# Patient Record
Sex: FEMALE | Race: WHITE | NOT HISPANIC OR LATINO | Employment: UNEMPLOYED | URBAN - NONMETROPOLITAN AREA
[De-identification: names, ages, dates, MRNs, and addresses within clinical notes are randomized per-mention and may not be internally consistent; named-entity substitution may affect disease eponyms.]

---

## 2020-01-16 ENCOUNTER — OFFICE VISIT (OUTPATIENT)
Dept: CARDIOLOGY | Facility: CLINIC | Age: 70
End: 2020-01-16

## 2020-01-16 VITALS
OXYGEN SATURATION: 91 % | DIASTOLIC BLOOD PRESSURE: 78 MMHG | SYSTOLIC BLOOD PRESSURE: 151 MMHG | BODY MASS INDEX: 34.39 KG/M2 | HEIGHT: 66 IN | HEART RATE: 89 BPM | WEIGHT: 214 LBS

## 2020-01-16 DIAGNOSIS — I10 ESSENTIAL HYPERTENSION: ICD-10-CM

## 2020-01-16 DIAGNOSIS — I20.8 ANGINAL EQUIVALENT (HCC): ICD-10-CM

## 2020-01-16 DIAGNOSIS — R06.09 DYSPNEA ON EXERTION: Primary | ICD-10-CM

## 2020-01-16 PROCEDURE — 99204 OFFICE O/P NEW MOD 45 MIN: CPT | Performed by: INTERNAL MEDICINE

## 2020-01-16 PROCEDURE — 93000 ELECTROCARDIOGRAM COMPLETE: CPT | Performed by: INTERNAL MEDICINE

## 2020-01-16 RX ORDER — LEVOTHYROXINE SODIUM 0.07 MG/1
TABLET ORAL
COMMUNITY
Start: 2020-01-14

## 2020-01-16 RX ORDER — ERGOCALCIFEROL 1.25 MG/1
50000 CAPSULE ORAL WEEKLY
COMMUNITY

## 2020-01-16 RX ORDER — ROSUVASTATIN CALCIUM 20 MG/1
TABLET, COATED ORAL
COMMUNITY
Start: 2020-01-06

## 2020-01-16 RX ORDER — MECLIZINE HYDROCHLORIDE 25 MG/1
TABLET ORAL
COMMUNITY
Start: 2020-01-06

## 2020-01-16 RX ORDER — AMLODIPINE BESYLATE 10 MG/1
TABLET ORAL
COMMUNITY
Start: 2019-12-30

## 2020-01-16 RX ORDER — METOPROLOL SUCCINATE 50 MG/1
TABLET, EXTENDED RELEASE ORAL
COMMUNITY
Start: 2019-12-30

## 2020-01-16 RX ORDER — ISOSORBIDE DINITRATE 10 MG/1
TABLET ORAL
COMMUNITY
Start: 2019-12-30

## 2020-01-16 RX ORDER — MELOXICAM 15 MG/1
TABLET ORAL
COMMUNITY
Start: 2019-12-27

## 2020-01-16 RX ORDER — GABAPENTIN 600 MG/1
TABLET ORAL
COMMUNITY
Start: 2020-01-03

## 2020-01-16 RX ORDER — HYDROCODONE BITARTRATE AND ACETAMINOPHEN 10; 325 MG/1; MG/1
TABLET ORAL
COMMUNITY
Start: 2020-01-03

## 2020-01-16 RX ORDER — HYDROXYCHLOROQUINE SULFATE 200 MG/1
TABLET, FILM COATED ORAL
COMMUNITY
Start: 2019-12-19

## 2020-01-16 RX ORDER — METOCLOPRAMIDE 10 MG/1
10 TABLET ORAL
COMMUNITY

## 2020-01-16 RX ORDER — NORTRIPTYLINE HYDROCHLORIDE 50 MG/1
CAPSULE ORAL
COMMUNITY
Start: 2020-01-14

## 2020-01-16 RX ORDER — ASPIRIN 325 MG
325 TABLET ORAL DAILY
COMMUNITY

## 2020-01-16 RX ORDER — VILAZODONE HYDROCHLORIDE 40 MG/1
TABLET ORAL
COMMUNITY
Start: 2020-01-06

## 2020-01-16 NOTE — H&P (VIEW-ONLY)
[facility] CARDIOLOGY  [address] [facility] [address]  [place] [address]  Phone: [phone]  Fax: [phone]    01/16/2020    Chief Complaint   Patient presents with   • Shortness of Breath   • Chest Pain        History:   Nathaly Jeffers is a 69 y.o. female seen in consultation, referred by Tonie Mccray MD for abnormal LHC.  She had a LHC in 2016 that showed she needed intervention. The facility in [place] was unable to do any intervention.  She states she has been trying to get transportation for 2 years to follow up for intervention here.She continues to have dyspnea upon exertion.Can be non-exertional.  She has occasional episodes of chest pain described as sharp.  Only last a second then resolved.    The chart and medications were reviewed today. Problems above addressed this visit.      Past Medical History:   Diagnosis Date   • Arthritis    • Fibromyalgia    • Hypertension    • Neuropathy    • Vertigo        Past Surgical History:   Procedure Laterality Date   • CARDIAC CATHETERIZATION     • CARPAL TUNNEL RELEASE Bilateral    • COLONOSCOPY     • FOOT SURGERY Right    • GALLBLADDER SURGERY     • GASTROSCOPY     • HERNIA REPAIR     • HYSTERECTOMY     • TUBAL ABDOMINAL LIGATION          Review of Systems:  Please see HPI  Constitution: No chills, no rigors, no unexplained weight loss or weight gain  Eyes:  No diplopia, no blurred vision, no loss of vision, conjunctiva is pink and sclera is anicteric  ENT:  No tinnitus, no otorrhea, no epistaxis, no sore throat   Respiratory: No cough, no hemoptysis  Cardiovascular: see HPI  Gastrointestinal: No nausea, no vomiting, no hematemesis, no diarrhea or constipation, no melena  Genitourinary: No frequency of dysuria no hematuria  Integument: No pruritis and  no skin rash  Hematologic / Lymphatic: No excessive bleeding, easy bruising, fatigue, lymphadenopathy and petechiae  Musculoskeletal: No joint pain, joint stiffness, joint swelling,  muscle pain, muscle weakness and neck pain  Neurological: No dizziness, headaches, light headedness, seizures and vertigo  Endocrine: No frequent urination and nocturia, temperature intolerance, weight gain, unintended and weight loss, unintended        Past Social History:  Social History     Socioeconomic History   • Marital status:      Spouse name: Not on file   • Number of children: Not on file   • Years of education: Not on file   • Highest education level: Not on file   Tobacco Use   • Smoking status: Former Smoker   • Smokeless tobacco: Never Used   Substance and Sexual Activity   • Alcohol use: Never     Frequency: Never   • Drug use: Never   • Sexual activity: Defer       Past Family History:  Family History   Problem Relation Age of Onset   • Hypertension Mother    • Stroke Mother    • Cervical cancer Sister    • Hepatitis Sister    • Hypertension Brother    • COPD Brother    • Hypertension Sister        Current Outpatient Medications   Medication Sig Dispense Refill   • amLODIPine (NORVASC) 10 MG tablet      • aspirin 325 MG tablet Take 325 mg by mouth Daily.     • gabapentin (NEURONTIN) 600 MG tablet      • HYDROcodone-acetaminophen (NORCO)  MG per tablet      • hydroxychloroquine (PLAQUENIL) 200 MG tablet      • isosorbide dinitrate (ISORDIL) 10 MG tablet      • levothyroxine (SYNTHROID, LEVOTHROID) 75 MCG tablet      • meclizine (ANTIVERT) 25 MG tablet      • meloxicam (MOBIC) 15 MG tablet      • metoclopramide (REGLAN) 10 MG tablet Take 10 mg by mouth 4 (Four) Times a Day Before Meals & at Bedtime.     • metoprolol succinate XL (TOPROL-XL) 50 MG 24 hr tablet      • nortriptyline (PAMELOR) 50 MG capsule      • rosuvastatin (CRESTOR) 20 MG tablet      • VIIBRYD 40 MG tablet tablet      • vitamin D (ERGOCALCIFEROL) 1.25 MG (59349 UT) capsule capsule Take 50,000 Units by mouth 1 (One) Time Per Week.       No current facility-administered medications for this visit.         Allergies      Allergen Reactions   • Sulfa Antibiotics Rash       Objective:  Vitals:    01/16/20 1559   BP: 151/78   Pulse: 89   SpO2: 91%         Comfortable NAD  PERRL, conjunctiva clear  Neck supple, no JVD or thyromegaly appreciated  S1/S2 RRR, no m/r/g  Lungs CTA B, normal effort  Abdomen S/NT/ND (+) BS, no HSM appreciated  Extremities warm, no clubbing, cyanosis, or edema  Normal gait  No visible or palpable skin lesions  A/Ox4, mood and affect appropriate  Pulse exam:   Feet are warm bilateral  1+ edema bilateral  Capillary refill is normal  No evidence of ulceration or color change of the toes  PULSES  Right DP and PT are 1+ and Left DP and PT are 2+    DATA:                   ECG 12 Lead  Date/Time: 1/16/2020 4:52 PM  Performed by: Tobi Garcia MD  Authorized by: Tobi Garcia MD   Comparison: not compared with previous ECG   Rhythm: sinus rhythm  Rate: normal  BPM: 87    Clinical impression: non-specific ECG  Comments: VT int:  173 ms  QRS dur:  98 ms  QT/Qtc:   396/441 ms  P-R-T axes:   48    35   44                 Dyspnea upon exertion  Equivalent angina  Essential hypertension well controlled.    Plan:    I will proceed with a [facility] 1-  I have explained the risks associated with the procedure to the patient including but not limited to an allergic reaction to the contrast material or medications used during the procedure bleeding, infection, and bruising at the catheter insertion site blood clots, which may trigger heart attack, stroke,  damage to the artery where the catheter was inserted, or damage to the arteries as the catheter travels through your body, irregular heart rhythm arrhythmias, kidney damage caused by the contrast material.    Patient's Body mass index is 34.54 kg/m². BMI is above normal parameters. Recommendations include: nutrition counseling.       No diagnosis found.     Thank you for allowing me to participate in the care of Nathaly Jeffers. Feel free to contact me directly with any  further questions or concerns.        Director, Cardiac Cath Lab      ERNESTINA Wray, acting as scribe for Tobi Garcia MD.   01/16/20  4:13 PM    I have read and agree the documentation that has been completed regarding this visit.  By signing this record, I attest that the documentation was completed by my physical presence and is an accurate record of the encounter.  Voice recognition / transcription technology used for some documentation in this chart in attempt to mitigate substantial inefficiencies created by this electronic health record technology.  As a result, there may be some typos and.or nonsensical language introduced into the chart that either overlooked in editing/review and/or that I am unable to correct as patient care needs require me to prioritize my attention to bedside patient care rather than electronic documentation. MA

## 2020-01-20 PROBLEM — I10 ESSENTIAL HYPERTENSION: Status: ACTIVE | Noted: 2020-01-20

## 2020-01-20 PROBLEM — R06.09 DYSPNEA ON EXERTION: Status: ACTIVE | Noted: 2020-01-20

## 2020-01-20 PROBLEM — I20.8 ANGINAL EQUIVALENT (HCC): Status: ACTIVE | Noted: 2020-01-20

## 2020-01-23 ENCOUNTER — HOSPITAL ENCOUNTER (OUTPATIENT)
Facility: HOSPITAL | Age: 70
Discharge: HOME | End: 2020-01-23
Attending: INTERNAL MEDICINE | Admitting: INTERNAL MEDICINE

## 2020-01-23 VITALS
TEMPERATURE: 98.3 F | HEIGHT: 66 IN | WEIGHT: 205 LBS | OXYGEN SATURATION: 97 % | DIASTOLIC BLOOD PRESSURE: 90 MMHG | HEART RATE: 81 BPM | RESPIRATION RATE: 16 BRPM | SYSTOLIC BLOOD PRESSURE: 139 MMHG | BODY MASS INDEX: 32.95 KG/M2

## 2020-01-23 DIAGNOSIS — I10 ESSENTIAL HYPERTENSION: ICD-10-CM

## 2020-01-23 DIAGNOSIS — R06.09 DYSPNEA ON EXERTION: ICD-10-CM

## 2020-01-23 DIAGNOSIS — I20.8 ANGINAL EQUIVALENT (HCC): ICD-10-CM

## 2020-01-23 LAB
ALBUMIN SERPL-MCNC: 4.17 G/DL (ref 3.5–5.2)
ALBUMIN/GLOB SERPL: 1.3 G/DL
ALP SERPL-CCNC: 82 U/L (ref 39–117)
ALT SERPL W P-5'-P-CCNC: 31 U/L (ref 1–33)
ANION GAP SERPL CALCULATED.3IONS-SCNC: 13.4 MMOL/L (ref 5–15)
AST SERPL-CCNC: 55 U/L (ref 1–32)
BASOPHILS # BLD AUTO: 0.07 10*3/MM3 (ref 0–0.2)
BASOPHILS NFR BLD AUTO: 0.7 % (ref 0–1.5)
BILIRUB SERPL-MCNC: 0.4 MG/DL (ref 0.2–1.2)
BUN BLD-MCNC: 12 MG/DL (ref 8–23)
BUN/CREAT SERPL: 13.6 (ref 7–25)
CALCIUM SPEC-SCNC: 9.3 MG/DL (ref 8.6–10.5)
CHLORIDE SERPL-SCNC: 101 MMOL/L (ref 98–107)
CO2 SERPL-SCNC: 26.6 MMOL/L (ref 22–29)
CREAT BLD-MCNC: 0.88 MG/DL (ref 0.57–1)
DEPRECATED RDW RBC AUTO: 41.9 FL (ref 37–54)
EOSINOPHIL # BLD AUTO: 0.35 10*3/MM3 (ref 0–0.4)
EOSINOPHIL NFR BLD AUTO: 3.6 % (ref 0.3–6.2)
ERYTHROCYTE [DISTWIDTH] IN BLOOD BY AUTOMATED COUNT: 13.3 % (ref 12.3–15.4)
GFR SERPL CREATININE-BSD FRML MDRD: 64 ML/MIN/1.73
GLOBULIN UR ELPH-MCNC: 3.1 GM/DL
GLUCOSE BLD-MCNC: 108 MG/DL (ref 65–99)
HCT VFR BLD AUTO: 43.3 % (ref 34–46.6)
HGB BLD-MCNC: 13.3 G/DL (ref 12–15.9)
IMM GRANULOCYTES # BLD AUTO: 0.05 10*3/MM3 (ref 0–0.05)
IMM GRANULOCYTES NFR BLD AUTO: 0.5 % (ref 0–0.5)
INR PPP: 0.93 (ref 0.9–1.1)
LYMPHOCYTES # BLD AUTO: 2.48 10*3/MM3 (ref 0.7–3.1)
LYMPHOCYTES NFR BLD AUTO: 25.3 % (ref 19.6–45.3)
MCH RBC QN AUTO: 26.7 PG (ref 26.6–33)
MCHC RBC AUTO-ENTMCNC: 30.7 G/DL (ref 31.5–35.7)
MCV RBC AUTO: 86.8 FL (ref 79–97)
MONOCYTES # BLD AUTO: 0.77 10*3/MM3 (ref 0.1–0.9)
MONOCYTES NFR BLD AUTO: 7.9 % (ref 5–12)
NEUTROPHILS # BLD AUTO: 6.08 10*3/MM3 (ref 1.7–7)
NEUTROPHILS NFR BLD AUTO: 62 % (ref 42.7–76)
NRBC BLD AUTO-RTO: 0 /100 WBC (ref 0–0.2)
PLATELET # BLD AUTO: 317 10*3/MM3 (ref 140–450)
PMV BLD AUTO: 10.4 FL (ref 6–12)
POTASSIUM BLD-SCNC: 3.7 MMOL/L (ref 3.5–5.2)
PROT SERPL-MCNC: 7.3 G/DL (ref 6–8.5)
PROTHROMBIN TIME: 12.9 SECONDS (ref 11–15.4)
RBC # BLD AUTO: 4.99 10*6/MM3 (ref 3.77–5.28)
SODIUM BLD-SCNC: 141 MMOL/L (ref 136–145)
WBC NRBC COR # BLD: 9.8 10*3/MM3 (ref 3.4–10.8)

## 2020-01-23 PROCEDURE — C1769 GUIDE WIRE: HCPCS | Performed by: INTERNAL MEDICINE

## 2020-01-23 PROCEDURE — C1894 INTRO/SHEATH, NON-LASER: HCPCS | Performed by: INTERNAL MEDICINE

## 2020-01-23 PROCEDURE — 0 IOPAMIDOL PER 1 ML: Performed by: INTERNAL MEDICINE

## 2020-01-23 PROCEDURE — 25010000002 HEPARIN (PORCINE) PER 1000 UNITS: Performed by: INTERNAL MEDICINE

## 2020-01-23 PROCEDURE — 93454 CORONARY ARTERY ANGIO S&I: CPT | Performed by: INTERNAL MEDICINE

## 2020-01-23 PROCEDURE — 85610 PROTHROMBIN TIME: CPT | Performed by: INTERNAL MEDICINE

## 2020-01-23 PROCEDURE — 85025 COMPLETE CBC W/AUTO DIFF WBC: CPT | Performed by: INTERNAL MEDICINE

## 2020-01-23 PROCEDURE — 80053 COMPREHEN METABOLIC PANEL: CPT | Performed by: INTERNAL MEDICINE

## 2020-01-23 RX ORDER — ASPIRIN 325 MG
325 TABLET ORAL DAILY
Status: CANCELLED | OUTPATIENT
Start: 2020-01-23

## 2020-01-23 RX ORDER — METOPROLOL SUCCINATE 50 MG/1
50 TABLET, EXTENDED RELEASE ORAL
Status: CANCELLED | OUTPATIENT
Start: 2020-01-23

## 2020-01-23 RX ORDER — LIDOCAINE HYDROCHLORIDE 20 MG/ML
INJECTION, SOLUTION INFILTRATION; PERINEURAL AS NEEDED
Status: DISCONTINUED | OUTPATIENT
Start: 2020-01-23 | End: 2020-01-23 | Stop reason: HOSPADM

## 2020-01-23 RX ORDER — SODIUM CHLORIDE 9 MG/ML
100 INJECTION, SOLUTION INTRAVENOUS ONCE
Status: CANCELLED | OUTPATIENT
Start: 2020-01-23 | End: 2020-01-23

## 2020-01-23 RX ORDER — ROSUVASTATIN CALCIUM 10 MG/1
10 TABLET, COATED ORAL DAILY
Status: CANCELLED | OUTPATIENT
Start: 2020-01-23

## 2020-01-23 RX ORDER — AMLODIPINE BESYLATE 5 MG/1
5 TABLET ORAL
Status: CANCELLED | OUTPATIENT
Start: 2020-01-23

## 2020-01-23 RX ORDER — LEVOTHYROXINE SODIUM 0.07 MG/1
75 TABLET ORAL
Status: CANCELLED | OUTPATIENT
Start: 2020-01-24

## 2020-01-23 RX ORDER — HYDROCODONE BITARTRATE AND ACETAMINOPHEN 10; 325 MG/1; MG/1
1 TABLET ORAL EVERY 6 HOURS PRN
Status: CANCELLED | OUTPATIENT
Start: 2020-01-23

## 2020-01-23 RX ORDER — ISOSORBIDE DINITRATE 10 MG/1
10 TABLET ORAL
Status: CANCELLED | OUTPATIENT
Start: 2020-01-23

## 2020-01-23 RX ORDER — SODIUM CHLORIDE 9 MG/ML
INJECTION, SOLUTION INTRAVENOUS CONTINUOUS PRN
Status: COMPLETED | OUTPATIENT
Start: 2020-01-23 | End: 2020-01-23

## 2020-01-23 NOTE — DISCHARGE INSTR - ACTIVITY
Do not lift more than 5 pounds with right arm for one week    Do not submerge right wrist in water for 3 days    Do not use right hand with excessive pulling, pushing or tugging for 1 week

## 2020-01-23 NOTE — NURSING NOTE
Discharge instructions given to patient.  Understanding verbalized.  Patient off floor for transport home per public transportation.

## (undated) DEVICE — Device

## (undated) DEVICE — DRSNG SURESITE WNDW 4X4.5

## (undated) DEVICE — ADULT, RADIOTRANSPARENT ELEMENT, COMPATIBLE W/ ZOLL: Brand: DEFIBRILLATION ELECTRODES

## (undated) DEVICE — ST INF PRI SMRTSTE 20DRP 2VLV 24ML 117

## (undated) DEVICE — CATH F5 INF 3DRC 100CM: Brand: INFINITI

## (undated) DEVICE — CATH F5 INF JR 4 100CM: Brand: INFINITI

## (undated) DEVICE — CATH F4 INF JR 4 100CM: Brand: INFINITI

## (undated) DEVICE — CATH F5 INF JR 5 100CM: Brand: INFINITI

## (undated) DEVICE — GLIDESHEATH SLENDER STAINLESS STEEL KIT: Brand: GLIDESHEATH SLENDER

## (undated) DEVICE — ADULT DISPOSABLE SINGLE-PATIENT USE PULSE OXIMETER SENSOR: Brand: NONIN

## (undated) DEVICE — PK CATH CARD 70

## (undated) DEVICE — ST EXT IV SMARTSITE 2VLV SP M LL 5ML IV1

## (undated) DEVICE — ASMBL SPK CONTRST CONTRL

## (undated) DEVICE — LN INJ CONTRST FLXCIL HP F/M LL 1200PSI10

## (undated) DEVICE — CANNULA,OXY,ADULT,SUPER SOFT,W/14'TUB,UC: Brand: MEDLINE INDUSTRIES, INC.

## (undated) DEVICE — CATH F5 INF JL 3.5 100CM: Brand: INFINITI

## (undated) DEVICE — RUNWAY RADL W/TOP PAD

## (undated) DEVICE — TR BAND RADIAL ARTERY COMPRESSION DEVICE: Brand: TR BAND

## (undated) DEVICE — RADIFOCUS GLIDEWIRE: Brand: GLIDEWIRE

## (undated) DEVICE — DRAPE, RADIAL, STERILE: Brand: MEDLINE

## (undated) DEVICE — GW INQW FIX/CORE PTFE J/3MM .035 260CM